# Patient Record
Sex: FEMALE | Race: WHITE | Employment: PART TIME | URBAN - METROPOLITAN AREA
[De-identification: names, ages, dates, MRNs, and addresses within clinical notes are randomized per-mention and may not be internally consistent; named-entity substitution may affect disease eponyms.]

---

## 2022-11-27 ENCOUNTER — HOSPITAL ENCOUNTER (EMERGENCY)
Age: 27
Discharge: HOME | End: 2022-11-27
Attending: FAMILY MEDICINE
Payer: MEDICAID

## 2022-11-27 VITALS
SYSTOLIC BLOOD PRESSURE: 146 MMHG | RESPIRATION RATE: 16 BRPM | DIASTOLIC BLOOD PRESSURE: 98 MMHG | TEMPERATURE: 98.5 F | HEIGHT: 68 IN | HEART RATE: 97 BPM | OXYGEN SATURATION: 97 % | BODY MASS INDEX: 44.41 KG/M2 | WEIGHT: 293 LBS

## 2022-11-27 DIAGNOSIS — S30.814A: Primary | ICD-10-CM

## 2022-11-27 DIAGNOSIS — N76.2: Primary | ICD-10-CM

## 2022-11-27 PROCEDURE — 99283 EMERGENCY DEPT VISIT LOW MDM: CPT

## 2022-11-27 RX ORDER — AMOXICILLIN AND CLAVULANATE POTASSIUM 875; 125 MG/1; MG/1
1 TABLET, FILM COATED ORAL 2 TIMES DAILY
Qty: 20 TABLET | Refills: 0 | Status: SHIPPED | OUTPATIENT
Start: 2022-11-27 | End: 2022-12-07

## 2022-11-27 RX ORDER — CLOTRIMAZOLE 1 %
CREAM WITH APPLICATOR VAGINAL
Qty: 45 G | Refills: 1 | Status: SHIPPED | OUTPATIENT
Start: 2022-11-27 | End: 2022-12-04

## 2022-11-27 ASSESSMENT — PAIN - FUNCTIONAL ASSESSMENT: PAIN_FUNCTIONAL_ASSESSMENT: 0-10

## 2022-11-27 ASSESSMENT — PAIN DESCRIPTION - DESCRIPTORS: DESCRIPTORS: ACHING

## 2022-11-27 ASSESSMENT — PAIN SCALES - GENERAL: PAINLEVEL_OUTOF10: 7

## 2022-11-27 ASSESSMENT — PAIN DESCRIPTION - LOCATION: LOCATION: VAGINA

## 2022-11-27 ASSESSMENT — ENCOUNTER SYMPTOMS: RESPIRATORY NEGATIVE: 1

## 2022-11-27 NOTE — ED PROVIDER NOTES
[address] [facility] ED  eMERGENCY dEPARTMENT eNCOUnter      Pt Name: Breezy Santos  MRN: [id number]  [place] 1995  Date of evaluation: 11/27/2022  Provider: Belkis Coe MD    CHIEF COMPLAINT       Chief Complaint   Patient presents with    Abscess    Vaginal Pain         HISTORY OF PRESENT ILLNESS   (Location/Symptom, Timing/Onset,Context/Setting, Quality, Duration, Modifying Factors, Severity)  Note limiting factors. Breezy Santos is a 28 y.o. female who presents to the emergency department vaginal pain  28years old presented to the ER with pain and swelling in her genital area that started 3 days ago patient admitted that she had her vaginal area scratch about 3 weeks ago while she was having sex had some pain since but in the last 3 days she noticed increased swelling and tenderness in the area. Have some chills but no fever no abnormal vaginal discharge no other complaint or concern    The history is provided by the patient. NursingNotes were reviewed. REVIEW OF SYSTEMS    (2-9 systems for level 4, 10 or more for level 5)     Review of Systems   Constitutional: Negative. HENT: Negative. Respiratory: Negative. Cardiovascular: Negative. Skin: Negative. Psychiatric/Behavioral: Negative. Except as noted above the remainder of the review of systems was reviewed and negative. PAST MEDICAL HISTORY     Past Medical History:   Diagnosis Date    Diabetes mellitus ([address]          SURGICALHISTORY     No past surgical history on file. CURRENT MEDICATIONS       Discharge Medication List as of 11/27/2022  5:18 PM          ALLERGIES     Shellfish-derived products    FAMILY HISTORY     No family history on file.        SOCIAL HISTORY       Social History     Socioeconomic History    Marital status: Single   Tobacco Use    Smoking status: Never    Smokeless tobacco: Never   Vaping Use    Vaping Use: Never used   Substance and Sexual Activity    Alcohol use: Never    Drug use: Never       SCREENINGS      @FLOW([id number])@      PHYSICAL EXAM    (up to 7 for level 4, 8 or more for level 5)     ED Triage Vitals [11/27/22 1412]   BP Temp Temp Source Heart Rate Resp SpO2 Height Weight   (!) 146/98 98.5 °F (36.9 °C) Oral 97 16 97 % 5' 8\" (1.727 m) (!) 325 lb (147.4 kg)       Physical Exam  Vitals and nursing note reviewed. Constitutional:       Appearance: She is well-developed. HENT:      Head: Normocephalic and atraumatic. Right Ear: External ear normal.      Left Ear: External ear normal.      Nose: Nose normal.      Mouth/Throat:      Mouth: Mucous membranes are moist.      Pharynx: Oropharynx is clear. Eyes:      Pupils: Pupils are equal, round, and reactive to light. Cardiovascular:      Rate and Rhythm: Normal rate and regular rhythm. Heart sounds: Normal heart sounds. Pulmonary:      Effort: Pulmonary effort is normal. No respiratory distress. Breath sounds: Normal breath sounds. No stridor. No wheezing, rhonchi or rales. Chest:      Chest wall: No tenderness. Abdominal:      General: Bowel sounds are normal.      Palpations: Abdomen is soft. Genitourinary:     Labia:         Right: Tenderness present. Comments: Significant localized area of tenderness and swelling of the right labia but no significant abscess or fluctuation also was more abrasion noted on the labia minora  Musculoskeletal:         General: Normal range of motion. Cervical back: Normal range of motion and neck supple. Skin:     General: Skin is warm and dry. Neurological:      Mental Status: She is alert and oriented to person, place, and time. Cranial Nerves: No cranial nerve deficit. Sensory: No sensory deficit. Motor: No abnormal muscle tone. Coordination: Coordination normal.      Deep Tendon Reflexes: Reflexes normal.   Psychiatric:         Behavior: Behavior normal.         Thought Content:  Thought content normal.         Judgment: Judgment normal. DIAGNOSTIC RESULTS     EKG: All EKG's are interpreted by the Emergency Department Physician who either signs or Co-signsthis chart in the absence of a cardiologist.        RADIOLOGY:   [place] Sinks such as CT, Ultrasound and MRI are read by the radiologist. Plain radiographic images are visualized and preliminarily interpreted by the emergency physician with the below findings:        Interpretation per the Radiologist below, if available at the time ofthis note:    No orders to display         ED BEDSIDE ULTRASOUND:   Performed by ED Physician - none    LABS:  Labs Reviewed - No data to display    All other labs were within normal range or not returned as of this dictation. EMERGENCY DEPARTMENT COURSE and DIFFERENTIAL DIAGNOSIS/MDM:   Vitals:    Vitals:    11/27/22 1412   BP: (!) 146/98   Pulse: 97   Resp: 16   Temp: 98.5 °F (36.9 °C)   TempSrc: Oral   SpO2: 97%   Weight: (!) 325 lb (147.4 kg)   Height: 5' 8\" (1.727 m)                MDM  Number of Diagnoses or Management Options  Abrasion of labia with infection, initial encounter  Diagnosis management comments: 28years old presented with genital pain and swelling after a scratch 3 weeks ago in her genital area was found to have a small abrasion in her labia with some swelling and tenderness. Consulted with GYN on-call who advised to discharge patient with Lotrimin vaginal cream along with oral antibiotic       Amount and/or Complexity of Data Reviewed  Clinical lab tests: ordered and reviewed  Tests in the radiology section of CPT®: reviewed        CONSULTS:  None    PROCEDURES:  Unless otherwise noted below, none     Procedures    FINAL IMPRESSION      1.  Abrasion of labia with infection, initial encounter          DISPOSITION/PLAN   DISPOSITION Decision To Discharge 11/27/2022 04:57:23 PM      PATIENT REFERRED TO:  Alberto Willingham     In 1 week      DISCHARGE MEDICATIONS:  Discharge Medication List as of 11/27/2022  5:18 PM        START taking these medications    Details   clotrimazole (GYNE-LOTRIMIN) 1 % vaginal cream Place vaginally 2 times daily. , Disp-45 g, R-1Normal      amoxicillin-clavulanate (AUGMENTIN) 875-125 MG per tablet Take 1 tablet by mouth 2 times daily for 10 days, Disp-20 tablet, R-0Normal                (Please note thatportions of this note were completed with a voice recognition program.  Efforts were made to edit the dictations but occasionally words are mis-transcribed.)    Roberta Jorge MD (electronically signed)  Attending Emergency Physician          Ahmet Lama MD  11/27/22 [id number]

## 2022-12-09 NOTE — PROGRESS NOTES
Patient seen in ED, has no OBGYN provider on file. Call and check if needs follow up for Abrasion Of Labia With Infection, Initial Encounter (Primary) and to schedule with our office.